# Patient Record
Sex: FEMALE | URBAN - METROPOLITAN AREA
[De-identification: names, ages, dates, MRNs, and addresses within clinical notes are randomized per-mention and may not be internally consistent; named-entity substitution may affect disease eponyms.]

---

## 2019-01-23 ENCOUNTER — APPOINTMENT (RX ONLY)
Dept: URBAN - METROPOLITAN AREA CLINIC 80 | Facility: CLINIC | Age: 34
Setting detail: DERMATOLOGY
End: 2019-01-23

## 2019-01-23 DIAGNOSIS — L91.0 HYPERTROPHIC SCAR: ICD-10-CM

## 2019-01-23 PROCEDURE — 11900 INJECT SKIN LESIONS </W 7: CPT

## 2019-01-23 PROCEDURE — ? INTRALESIONAL KENALOG

## 2019-01-23 PROCEDURE — ? COUNSELING

## 2019-01-23 ASSESSMENT — LOCATION ZONE DERM: LOCATION ZONE: TRUNK

## 2019-01-23 ASSESSMENT — LOCATION DETAILED DESCRIPTION DERM
LOCATION DETAILED: LEFT MID-UPPER BACK
LOCATION DETAILED: LEFT SUPERIOR UPPER BACK

## 2019-01-23 ASSESSMENT — LOCATION SIMPLE DESCRIPTION DERM: LOCATION SIMPLE: LEFT UPPER BACK

## 2019-01-23 NOTE — PROCEDURE: INTRALESIONAL KENALOG
Expiration Date (Optional): MAR2020
Administered By (Optional): Dr. Montalvo
Detail Level: Detailed
X Size Of Lesion In Cm (Optional): 0
Medical Necessity Clause: This procedure was medically necessary because the lesions that were treated were:
Include Z78.9 (Other Specified Conditions Influencing Health Status) As An Associated Diagnosis?: No
Kenalog Preparation: Kenalog
Lot # (Optional): [id number]
Ndc# For Kenalog Only: 3210-4847-09
Total Volume Injected (Ccs- Only Use Numbers And Decimals): 1
Consent: The risks of atrophy were reviewed with the patient.
Concentration Of Solution Injected (Mg/Ml): 40.0

## 2019-02-13 ENCOUNTER — APPOINTMENT (RX ONLY)
Dept: URBAN - METROPOLITAN AREA CLINIC 80 | Facility: CLINIC | Age: 34
Setting detail: DERMATOLOGY
End: 2019-02-13

## 2019-02-13 DIAGNOSIS — L91.0 HYPERTROPHIC SCAR: ICD-10-CM

## 2019-02-13 PROCEDURE — ? PRESCRIPTION

## 2019-02-13 PROCEDURE — 11900 INJECT SKIN LESIONS </W 7: CPT

## 2019-02-13 PROCEDURE — ? INTRALESIONAL KENALOG

## 2019-02-13 PROCEDURE — ? COUNSELING

## 2019-02-13 RX ORDER — LIDOCAINE, PRILOCAINE 25; 25 MG/G; MG/G
CREAM TOPICAL AS DIRECTED
Qty: 1 | Refills: 1 | Status: ERX | COMMUNITY
Start: 2019-02-13

## 2019-02-13 RX ADMIN — LIDOCAINE, PRILOCAINE: 25; 25 CREAM TOPICAL at 00:00

## 2019-09-03 ENCOUNTER — APPOINTMENT (RX ONLY)
Dept: URBAN - METROPOLITAN AREA CLINIC 80 | Facility: CLINIC | Age: 34
Setting detail: DERMATOLOGY
End: 2019-09-03

## 2019-09-03 DIAGNOSIS — L91.0 HYPERTROPHIC SCAR: ICD-10-CM

## 2019-09-03 PROCEDURE — ? INTRALESIONAL KENALOG

## 2019-09-03 PROCEDURE — ? COUNSELING

## 2019-09-03 PROCEDURE — 11900 INJECT SKIN LESIONS </W 7: CPT

## 2019-09-03 PROCEDURE — ? PRESCRIPTION

## 2019-09-03 PROCEDURE — ? TREATMENT REGIMEN

## 2019-09-03 PROCEDURE — ? OTHER

## 2019-09-03 RX ORDER — LIDOCAINE, PRILOCAINE 25; 25 MG/G; MG/G
CREAM TOPICAL AS DIRECTED
Qty: 1 | Refills: 1 | Status: ERX

## 2019-09-03 RX ORDER — IMIQUIMOD 50 MG/G
CREAM TOPICAL QHS
Qty: 1 | Refills: 3 | Status: ERX | COMMUNITY
Start: 2019-09-03

## 2019-09-03 RX ADMIN — IMIQUIMOD: 50 CREAM TOPICAL at 00:00

## 2019-09-03 ASSESSMENT — LOCATION SIMPLE DESCRIPTION DERM
LOCATION SIMPLE: CHEST
LOCATION SIMPLE: LEFT UPPER BACK

## 2019-09-03 ASSESSMENT — LOCATION DETAILED DESCRIPTION DERM
LOCATION DETAILED: LEFT SUPERIOR UPPER BACK
LOCATION DETAILED: RIGHT LATERAL SUPERIOR CHEST

## 2019-09-03 ASSESSMENT — LOCATION ZONE DERM: LOCATION ZONE: TRUNK

## 2019-09-03 NOTE — PROCEDURE: TREATMENT REGIMEN
Initiate Treatment: Ativan 2 mg tablet  Sig: Take 1 tablet prior to procedure. (Patient instructed to bring mother to appointment to drive her) \\n\\nlidocaine-prilocaine 2.5 %-2.5 % topical cream As directed\\nSig: Apply to keloids 30 minutes prior to appointment
Plan: Discussed shave excision of small keloid on right chest with prior topical and local numbing then apply imiquimod nightly x 12 weeks. (patient informed to  the medication then call office to schedule biopsy with Dr. Montalvo)\\nPatient has phobia of needles/injections, gave Ativan 1 mg #2 tablets, 0 refills, to take 1 tablet directly prior to procedure
Detail Level: Zone

## 2024-01-29 NOTE — PROCEDURE: INTRALESIONAL KENALOG
Detail Level: Detailed
Consent: The risks of atrophy were reviewed with the patient.
Lot # (Optional): [id number]
Include Z78.9 (Other Specified Conditions Influencing Health Status) As An Associated Diagnosis?: No
X Size Of Lesion In Cm (Optional): 0
fiber/residue restricted/supplement (specify)
Concentration Of Solution Injected (Mg/Ml): 40.0
Expiration Date (Optional): MAR2020
Ndc# For Kenalog Only: 4785-9210-35
Administered By (Optional): Dr. Montalvo
Total Volume Injected (Ccs- Only Use Numbers And Decimals): 1
Medical Necessity Clause: This procedure was medically necessary because the lesions that were treated were:
Kenalog Preparation: Kenalog

## 2024-05-23 ENCOUNTER — APPOINTMENT (RX ONLY)
Dept: URBAN - METROPOLITAN AREA CLINIC 80 | Facility: CLINIC | Age: 39
Setting detail: DERMATOLOGY
End: 2024-05-23

## 2024-05-23 DIAGNOSIS — L0390 CELLULITIS AND ABSCESS OF UNSPECIFIED SITES: ICD-10-CM

## 2024-05-23 DIAGNOSIS — L91.0 HYPERTROPHIC SCAR: ICD-10-CM

## 2024-05-23 DIAGNOSIS — L0391 CELLULITIS AND ABSCESS OF UNSPECIFIED SITES: ICD-10-CM

## 2024-05-23 PROBLEM — L02.01 CUTANEOUS ABSCESS OF FACE: Status: ACTIVE | Noted: 2024-05-23

## 2024-05-23 PROCEDURE — ? SURGICAL DECISION MAKING

## 2024-05-23 PROCEDURE — 99203 OFFICE O/P NEW LOW 30 MIN: CPT

## 2024-05-23 PROCEDURE — ? DEFER

## 2024-05-23 PROCEDURE — ? PRESCRIPTION

## 2024-05-23 PROCEDURE — ? COUNSELING

## 2024-05-23 PROCEDURE — ? PRESCRIPTION MEDICATION MANAGEMENT

## 2024-05-23 RX ORDER — DOXYCYCLINE HYCLATE 100 MG/1
1 CAPSULE, GELATIN COATED ORAL BID
Qty: 56 | Refills: 0 | Status: ERX | COMMUNITY
Start: 2024-05-23

## 2024-05-23 RX ADMIN — DOXYCYCLINE HYCLATE 1: 100 CAPSULE, GELATIN COATED ORAL at 00:00

## 2024-05-23 ASSESSMENT — LOCATION DETAILED DESCRIPTION DERM
LOCATION DETAILED: LEFT LATERAL BUCCAL CHEEK
LOCATION DETAILED: SUBMENTAL CHIN

## 2024-05-23 ASSESSMENT — LOCATION SIMPLE DESCRIPTION DERM
LOCATION SIMPLE: SUBMENTAL CHIN
LOCATION SIMPLE: LEFT CHEEK

## 2024-05-23 ASSESSMENT — LOCATION ZONE DERM: LOCATION ZONE: FACE

## 2024-05-23 NOTE — PROCEDURE: PRESCRIPTION MEDICATION MANAGEMENT
Render In Strict Bullet Format?: No
Initiate Treatment: doxycycline hyclate 100 mg capsule Bid\\nQuantity: 56.0 Capsule  Days Supply: 28\\nSig: Take one tablet by mouth twice a day with food
Detail Level: Zone